# Patient Record
Sex: MALE | Race: WHITE | NOT HISPANIC OR LATINO | Employment: FULL TIME | URBAN - METROPOLITAN AREA
[De-identification: names, ages, dates, MRNs, and addresses within clinical notes are randomized per-mention and may not be internally consistent; named-entity substitution may affect disease eponyms.]

---

## 2023-10-30 ENCOUNTER — OFFICE VISIT (OUTPATIENT)
Dept: FAMILY MEDICINE CLINIC | Facility: CLINIC | Age: 43
End: 2023-10-30
Payer: COMMERCIAL

## 2023-10-30 VITALS
HEART RATE: 70 BPM | RESPIRATION RATE: 16 BRPM | HEIGHT: 72 IN | BODY MASS INDEX: 37.53 KG/M2 | DIASTOLIC BLOOD PRESSURE: 92 MMHG | OXYGEN SATURATION: 97 % | TEMPERATURE: 98.1 F | WEIGHT: 277.1 LBS | SYSTOLIC BLOOD PRESSURE: 152 MMHG

## 2023-10-30 DIAGNOSIS — Z00.00 ENCOUNTER FOR MEDICAL EXAMINATION TO ESTABLISH CARE: ICD-10-CM

## 2023-10-30 DIAGNOSIS — E66.9 CLASS 2 OBESITY WITHOUT SERIOUS COMORBIDITY WITH BODY MASS INDEX (BMI) OF 37.0 TO 37.9 IN ADULT, UNSPECIFIED OBESITY TYPE: ICD-10-CM

## 2023-10-30 DIAGNOSIS — H91.93 HEARING DIFFICULTY OF BOTH EARS: ICD-10-CM

## 2023-10-30 DIAGNOSIS — Z00.00 ANNUAL PHYSICAL EXAM: Primary | ICD-10-CM

## 2023-10-30 DIAGNOSIS — M72.2 PLANTAR FASCIITIS OF LEFT FOOT: ICD-10-CM

## 2023-10-30 DIAGNOSIS — Z00.00 ENCOUNTER FOR PREVENTIVE CARE: ICD-10-CM

## 2023-10-30 DIAGNOSIS — J30.89 ENVIRONMENTAL AND SEASONAL ALLERGIES: ICD-10-CM

## 2023-10-30 RX ORDER — CETIRIZINE HYDROCHLORIDE 10 MG/1
TABLET ORAL
COMMUNITY
Start: 2013-01-10

## 2023-10-30 NOTE — PROGRESS NOTES
"Chief Complaint  Establish Care (Pt needs physical and labs, wants to get health back on track. )    Subjective        Justice Min presents to [facility]  History of Present Illness  Establishing medical care  Annual physical today  Complaining of  ringing in ears on and off, muffling sounds for years but lately in last few months problem has gone worsens.  Pt stated he feels like he has sometimes hearing issues b/l.   Heel of left foot has hardened area which gets painful would like to get referral to podiatry.  Review of system is negative for fever, headache, chest pain, shortness of breath, palpitation, nausea, vomiting, any recent change in bladder habits.        Objective   Vital Signs:  /92 (BP Location: Left arm, Patient Position: Sitting, Cuff Size: Large Adult)   Pulse 70   Temp 98.1 °F (36.7 °C) (Oral)   Resp 16   Ht 182.9 cm (72\")   Wt 126 kg (277 lb 1.6 oz)   SpO2 97%   BMI 37.58 kg/m²   Estimated body mass index is 37.58 kg/m² as calculated from the following:    Height as of this encounter: 182.9 cm (72\").    Weight as of this encounter: 126 kg (277 lb 1.6 oz).       Class 2 Severe Obesity (BMI >=35 and <=39.9). Obesity-related health conditions include the following: none. Obesity is newly identified. BMI is is above average; BMI management plan is completed. We discussed portion control and increasing exercise.      Physical Exam  HENT:      Right Ear: Tympanic membrane, ear canal and external ear normal.      Left Ear: Tympanic membrane, ear canal and external ear normal.      Mouth/Throat:      Mouth: Mucous membranes are moist.      Pharynx: Oropharynx is clear.   Eyes:      Extraocular Movements: Extraocular movements intact.      Conjunctiva/sclera: Conjunctivae normal.      Pupils: Pupils are equal, round, and reactive to light.   Cardiovascular:      Rate and Rhythm: Normal rate.      Pulses: Normal pulses.      Heart sounds: Normal heart sounds. "   Pulmonary:      Effort: Pulmonary effort is normal.      Breath sounds: Normal breath sounds.   Abdominal:      General: Bowel sounds are normal.      Palpations: Abdomen is soft.   Musculoskeletal:         General: Normal range of motion.      Cervical back: Normal range of motion.   Skin:     General: Skin is warm.   Neurological:      Mental Status: He is alert and oriented to person, place, and time.        Result Review :                   Assessment and Plan   Diagnoses and all orders for this visit:    1. Annual physical exam (Primary)  -     CBC Auto Differential  -     Comprehensive Metabolic Panel  -     Lipid Panel With / Chol / HDL Ratio  -     TSH  -     Urinalysis With Microscopic - Urine, Clean Catch    2. Hearing difficulty of both ears  -     CBC Auto Differential  -     Comprehensive Metabolic Panel  -     Lipid Panel With / Chol / HDL Ratio  -     TSH  -     Urinalysis With Microscopic - Urine, Clean Catch  -     Ambulatory Referral to Audiology    3. Encounter for medical examination to establish care  -     CBC Auto Differential  -     Comprehensive Metabolic Panel  -     Lipid Panel With / Chol / HDL Ratio  -     TSH  -     Urinalysis With Microscopic - Urine, Clean Catch    4. Environmental and seasonal allergies    5. Plantar fasciitis of left foot  -     Ambulatory Referral to Podiatry    6. Encounter for preventive care    7. Class 2 obesity without serious comorbidity with body mass index (BMI) of 37.0 to 37.9 in adult, unspecified obesity type  Comments:  BMI 37.58, TSH ordered, encourage weight loss, more fruits and vegetables in the diet    Other orders  -     CBC & Differential  -     Microscopic Examination -    #Annual physical exam  Labs ordered, will review  #Hearing difficulty bilateral  Will refer patient to audiology for hearing test  #Environmental seasonal allergies  Taking Zyrtec 10 mg p.o. daily  #Planter fasciitis of left foot  Advised stretching exercises, can try  soaking feet in Epsom salt in severe pain  Podiatry referral given for further evaluation and management  #Preventive care encouraged to get flu, COVID shot.    Patient encouraged to partake of healthy diet rich in fresh fruits and vegetables as well as lean proteins.  Patient encouraged to participate in daily exercise with goal of 30 min sustained activity.  Wear seatbelt when driving  Flu shot annually         Follow Up   No follow-ups on file.  Patient was given instructions and counseling regarding his condition or for health maintenance advice. Please see specific information pulled into the AVS if appropriate.

## 2023-10-31 LAB
ALBUMIN SERPL-MCNC: 4.4 G/DL (ref 4.1–5.1)
ALBUMIN/GLOB SERPL: 1.9 {RATIO} (ref 1.2–2.2)
ALP SERPL-CCNC: 124 IU/L (ref 44–121)
ALT SERPL-CCNC: 33 IU/L (ref 0–44)
APPEARANCE UR: CLEAR
AST SERPL-CCNC: 23 IU/L (ref 0–40)
BACTERIA #/AREA URNS HPF: NORMAL /[HPF]
BASOPHILS # BLD AUTO: 0 X10E3/UL (ref 0–0.2)
BASOPHILS NFR BLD AUTO: 0 %
BILIRUB SERPL-MCNC: 0.7 MG/DL (ref 0–1.2)
BILIRUB UR QL STRIP: NEGATIVE
BUN SERPL-MCNC: 15 MG/DL (ref 6–24)
BUN/CREAT SERPL: 15 (ref 9–20)
CALCIUM SERPL-MCNC: 8.9 MG/DL (ref 8.7–10.2)
CASTS URNS QL MICRO: NORMAL /LPF
CHLORIDE SERPL-SCNC: 105 MMOL/L (ref 96–106)
CHOLEST SERPL-MCNC: 195 MG/DL (ref 100–199)
CHOLEST/HDLC SERPL: 3.9 RATIO (ref 0–5)
CO2 SERPL-SCNC: 22 MMOL/L (ref 20–29)
COLOR UR: YELLOW
CREAT SERPL-MCNC: 0.97 MG/DL (ref 0.76–1.27)
EGFRCR SERPLBLD CKD-EPI 2021: 100 ML/MIN/1.73
EOSINOPHIL # BLD AUTO: 0.1 X10E3/UL (ref 0–0.4)
EOSINOPHIL NFR BLD AUTO: 1 %
EPI CELLS #/AREA URNS HPF: NORMAL /HPF (ref 0–10)
ERYTHROCYTE [DISTWIDTH] IN BLOOD BY AUTOMATED COUNT: 13 % (ref 11.6–15.4)
GLOBULIN SER CALC-MCNC: 2.3 G/DL (ref 1.5–4.5)
GLUCOSE SERPL-MCNC: 88 MG/DL (ref 70–99)
GLUCOSE UR QL STRIP: NEGATIVE
HCT VFR BLD AUTO: 44.2 % (ref 37.5–51)
HDLC SERPL-MCNC: 50 MG/DL
HGB BLD-MCNC: 14.4 G/DL (ref 13–17.7)
HGB UR QL STRIP: NEGATIVE
IMM GRANULOCYTES # BLD AUTO: 0 X10E3/UL (ref 0–0.1)
IMM GRANULOCYTES NFR BLD AUTO: 0 %
KETONES UR QL STRIP: NEGATIVE
LDLC SERPL CALC-MCNC: 112 MG/DL (ref 0–99)
LEUKOCYTE ESTERASE UR QL STRIP: NEGATIVE
LYMPHOCYTES # BLD AUTO: 1.9 X10E3/UL (ref 0.7–3.1)
LYMPHOCYTES NFR BLD AUTO: 25 %
MCH RBC QN AUTO: 29.3 PG (ref 26.6–33)
MCHC RBC AUTO-ENTMCNC: 32.6 G/DL (ref 31.5–35.7)
MCV RBC AUTO: 90 FL (ref 79–97)
MICRO URNS: NORMAL
MICRO URNS: NORMAL
MONOCYTES # BLD AUTO: 0.5 X10E3/UL (ref 0.1–0.9)
MONOCYTES NFR BLD AUTO: 7 %
NEUTROPHILS # BLD AUTO: 4.9 X10E3/UL (ref 1.4–7)
NEUTROPHILS NFR BLD AUTO: 67 %
NITRITE UR QL STRIP: NEGATIVE
PH UR STRIP: 6 [PH] (ref 5–7.5)
PLATELET # BLD AUTO: 259 X10E3/UL (ref 150–450)
POTASSIUM SERPL-SCNC: 4.2 MMOL/L (ref 3.5–5.2)
PROT SERPL-MCNC: 6.7 G/DL (ref 6–8.5)
PROT UR QL STRIP: NEGATIVE
RBC # BLD AUTO: 4.91 X10E6/UL (ref 4.14–5.8)
RBC #/AREA URNS HPF: NORMAL /HPF (ref 0–2)
SODIUM SERPL-SCNC: 142 MMOL/L (ref 134–144)
SP GR UR STRIP: 1.01 (ref 1–1.03)
TRIGL SERPL-MCNC: 187 MG/DL (ref 0–149)
TSH SERPL DL<=0.005 MIU/L-ACNC: 1.05 UIU/ML (ref 0.45–4.5)
UROBILINOGEN UR STRIP-MCNC: 0.2 MG/DL (ref 0.2–1)
VLDLC SERPL CALC-MCNC: 33 MG/DL (ref 5–40)
WBC # BLD AUTO: 7.4 X10E3/UL (ref 3.4–10.8)
WBC #/AREA URNS HPF: NORMAL /HPF (ref 0–5)

## 2023-11-13 ENCOUNTER — OFFICE VISIT (OUTPATIENT)
Dept: FAMILY MEDICINE CLINIC | Facility: CLINIC | Age: 43
End: 2023-11-13
Payer: COMMERCIAL

## 2023-11-13 VITALS
DIASTOLIC BLOOD PRESSURE: 88 MMHG | OXYGEN SATURATION: 98 % | RESPIRATION RATE: 16 BRPM | HEIGHT: 72 IN | HEART RATE: 75 BPM | TEMPERATURE: 98.3 F | BODY MASS INDEX: 37.15 KG/M2 | WEIGHT: 274.3 LBS | SYSTOLIC BLOOD PRESSURE: 122 MMHG

## 2023-11-13 DIAGNOSIS — F41.9 ANXIETY: ICD-10-CM

## 2023-11-13 DIAGNOSIS — I10 PRIMARY HYPERTENSION: Primary | ICD-10-CM

## 2023-11-13 PROCEDURE — 99214 OFFICE O/P EST MOD 30 MIN: CPT | Performed by: STUDENT IN AN ORGANIZED HEALTH CARE EDUCATION/TRAINING PROGRAM

## 2023-11-13 RX ORDER — LISINOPRIL 5 MG/1
5 TABLET ORAL DAILY
Qty: 90 TABLET | Refills: 1 | Status: SHIPPED | OUTPATIENT
Start: 2023-11-13

## 2023-11-13 NOTE — PROGRESS NOTES
"Chief Complaint  Follow-up (Follow up on bp. 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  For hypertension.  Patient is monitoring blood pressure at home with wrist monitor and all his readings were coming more than 150/100.  Patient stated he brought his blood pressure machine today to this office just to compare his blood pressure with manual machine.  Also complain of having anxiety symptoms.  Patient stated financially he and his family is doing very well although his wife Ms. Julien has recently been diagnosed with breast cancer second time and is undergoing radiation therapy and patient mother who is also cancer patient lives with them.  Patient has 2 kids son 15 years old who is in high school and daughter who is 10 years old studies in elementary school.      Objective   Vital Signs:  /88 (BP Location: Left arm, Patient Position: Sitting, Cuff Size: Large Adult)   Pulse 75   Temp 98.3 °F (36.8 °C) (Oral)   Resp 16   Ht 182 cm (71.65\")   Wt 124 kg (274 lb 4.8 oz)   SpO2 98%   BMI 37.56 kg/m²   Estimated body mass index is 37.56 kg/m² as calculated from the following:    Height as of this encounter: 182 cm (71.65\").    Weight as of this encounter: 124 kg (274 lb 4.8 oz).               Physical Exam  HENT:      Head: Normocephalic and atraumatic.      Mouth/Throat:      Mouth: Mucous membranes are moist.      Pharynx: Oropharynx is clear.   Eyes:      Extraocular Movements: Extraocular movements intact.      Conjunctiva/sclera: Conjunctivae normal.      Pupils: Pupils are equal, round, and reactive to light.   Cardiovascular:      Rate and Rhythm: Normal rate and regular rhythm.   Pulmonary:      Effort: Pulmonary effort is normal.      Breath sounds: Normal breath sounds.   Abdominal:      General: Bowel sounds are normal.      Palpations: Abdomen is soft.   Musculoskeletal:         General: Normal range of motion.      " Cervical back: Neck supple.   Skin:     General: Skin is warm.      Capillary Refill: Capillary refill takes less than 2 seconds.   Neurological:      General: No focal deficit present.      Mental Status: He is alert and oriented to person, place, and time. Mental status is at baseline.   Psychiatric:         Mood and Affect: Mood normal.        Result Review :                   Assessment and Plan   Diagnoses and all orders for this visit:    1. Primary hypertension (Primary)  -     lisinopril (PRINIVIL,ZESTRIL) 5 MG tablet; Take 1 tablet by mouth Daily.  Dispense: 90 tablet; Refill: 1    2. Anxiety    #Hypertension  Newly diagnosed  Recheck blood pressure 140/90  All home readings are more than 150/100  Also check patient blood pressure with his wrist monitoring machine and it came out 150/100  Started patient on lisinopril 5 mg p.o. daily  Continue BP monitoring at home  RTC in a month for recheck of the blood pressure and medication management    #Anxiety  Advised patient to do breathing exercise or distraction technique in order to deal with anxiety symptoms  We will follow-up in the next visit    Recommended the following for better blood pressure management: take medication(s) daily as recommended, maintain low sodium diet (< 3 grams), lose weight , exercise regularly, minimizealcohol , manage stress better, and decrease caffeine           Follow Up   No follow-ups on file.  Patient was given instructions and counseling regarding his condition or for health maintenance advice. Please see specific information pulled into the AVS if appropriate.

## 2023-12-11 ENCOUNTER — OFFICE VISIT (OUTPATIENT)
Dept: FAMILY MEDICINE CLINIC | Facility: CLINIC | Age: 43
End: 2023-12-11
Payer: COMMERCIAL

## 2023-12-11 VITALS
BODY MASS INDEX: 37.52 KG/M2 | RESPIRATION RATE: 16 BRPM | HEART RATE: 74 BPM | TEMPERATURE: 97.7 F | DIASTOLIC BLOOD PRESSURE: 82 MMHG | HEIGHT: 72 IN | WEIGHT: 277 LBS | OXYGEN SATURATION: 96 % | SYSTOLIC BLOOD PRESSURE: 140 MMHG

## 2023-12-11 DIAGNOSIS — I10 PRIMARY HYPERTENSION: Primary | ICD-10-CM

## 2023-12-11 DIAGNOSIS — E78.5 HYPERLIPIDEMIA, UNSPECIFIED HYPERLIPIDEMIA TYPE: ICD-10-CM

## 2023-12-11 PROCEDURE — 99214 OFFICE O/P EST MOD 30 MIN: CPT | Performed by: STUDENT IN AN ORGANIZED HEALTH CARE EDUCATION/TRAINING PROGRAM

## 2023-12-11 RX ORDER — LISINOPRIL 10 MG/1
10 TABLET ORAL DAILY
Qty: 90 TABLET | Refills: 3 | Status: SHIPPED | OUTPATIENT
Start: 2023-12-11

## 2023-12-11 NOTE — PROGRESS NOTES
"Chief Complaint  Follow-up (Follow up on BP )    Subjective        Justice Min presents to [facility]  History of Present Illness  For follow-up on the hypertension.  Patient stated his symptoms of feeling fatigue, tiredness, having sweats has resolved since he started on this medication.  Denies having any negative effect of the medication  Review of system is negative for fever, headache, chest pain, shortness of breath, palpitation, nausea, vomiting, any recent change in bladder habits.      Objective   Vital Signs:  /82 (BP Location: Left arm, Patient Position: Sitting, Cuff Size: Large Adult)   Pulse 74   Temp 97.7 °F (36.5 °C) (Oral)   Resp 16   Ht 182 cm (71.65\")   Wt 126 kg (277 lb)   SpO2 96%   BMI 37.93 kg/m²   Estimated body mass index is 37.93 kg/m² as calculated from the following:    Height as of this encounter: 182 cm (71.65\").    Weight as of this encounter: 126 kg (277 lb).               Physical Exam  HENT:      Head: Normocephalic and atraumatic.      Mouth/Throat:      Mouth: Mucous membranes are moist.      Pharynx: Oropharynx is clear.   Eyes:      Extraocular Movements: Extraocular movements intact.      Conjunctiva/sclera: Conjunctivae normal.      Pupils: Pupils are equal, round, and reactive to light.   Cardiovascular:      Rate and Rhythm: Normal rate and regular rhythm.   Pulmonary:      Effort: Pulmonary effort is normal.      Breath sounds: Normal breath sounds.   Abdominal:      General: Bowel sounds are normal.      Palpations: Abdomen is soft.   Musculoskeletal:         General: Normal range of motion.      Cervical back: Neck supple.   Skin:     General: Skin is warm.      Capillary Refill: Capillary refill takes less than 2 seconds.   Neurological:      General: No focal deficit present.      Mental Status: He is alert and oriented to person, place, and time. Mental status is at baseline.   Psychiatric:         Mood and Affect: Mood " normal.        Result Review :                   Assessment and Plan   Diagnoses and all orders for this visit:    1. Primary hypertension (Primary)  -     lisinopril (PRINIVIL,ZESTRIL) 10 MG tablet; Take 1 tablet by mouth Daily.  Dispense: 90 tablet; Refill: 3  -     Lipid Panel With / Chol / HDL Ratio; Future  -     Basic Metabolic Panel; Future    2. Hyperlipidemia, unspecified hyperlipidemia type  -     Lipid Panel With / Chol / HDL Ratio; Future  -     Basic Metabolic Panel; Future      Pretension  Recheck blood pressure 140/88  Increase lisinopril to 10 mg p.o. daily  Continue BP monitoring at home  Bring BP readings in the next office visit  RTC in 6 months or sooner if blood pressure is more than 140/90  Recommended the following for better blood pressure management: take medication(s) daily as recommended, maintain low sodium diet (< 3 grams), lose weight , exercise regularly, minimizealcohol , manage stress better, and decrease caffeine         Follow Up   No follow-ups on file.  Patient was given instructions and counseling regarding his condition or for health maintenance advice. Please see specific information pulled into the AVS if appropriate.

## 2024-03-25 ENCOUNTER — OFFICE VISIT (OUTPATIENT)
Dept: FAMILY MEDICINE CLINIC | Facility: CLINIC | Age: 44
End: 2024-03-25
Payer: COMMERCIAL

## 2024-03-25 VITALS
TEMPERATURE: 97.2 F | RESPIRATION RATE: 14 BRPM | SYSTOLIC BLOOD PRESSURE: 132 MMHG | DIASTOLIC BLOOD PRESSURE: 78 MMHG | OXYGEN SATURATION: 96 % | HEIGHT: 72 IN | HEART RATE: 70 BPM | WEIGHT: 289 LBS | BODY MASS INDEX: 39.14 KG/M2

## 2024-03-25 DIAGNOSIS — R21 RASH: Primary | ICD-10-CM

## 2024-03-25 PROCEDURE — 99213 OFFICE O/P EST LOW 20 MIN: CPT | Performed by: STUDENT IN AN ORGANIZED HEALTH CARE EDUCATION/TRAINING PROGRAM

## 2024-03-25 RX ORDER — TRIAMCINOLONE ACETONIDE 1 MG/G
1 OINTMENT TOPICAL 2 TIMES DAILY
Qty: 80 G | Refills: 0 | Status: SHIPPED | OUTPATIENT
Start: 2024-03-25

## 2024-03-25 RX ORDER — METHYLPREDNISOLONE 4 MG/1
TABLET ORAL
Qty: 21 TABLET | Refills: 0 | Status: SHIPPED | OUTPATIENT
Start: 2024-03-25

## 2024-03-25 NOTE — PROGRESS NOTES
"Chief Complaint  Rash (On left thigh waist and buttock , a week ago he was doing yard work and next morning noticed it middle of week noticed spreading. Pt says its just a little itchy not painful. Takes bendryal at night )    Subjective        Justice Min presents to [facility]  History of Present Illness  Rash started last week, initially responded to zinc oxide but then started new rash closer to previous one. Not painful, just itchy but got worried since rash get enlarged involved left buttock area in a night.    Objective   Vital Signs:  /78 (BP Location: Left arm, Patient Position: Sitting, Cuff Size: Large Adult)   Pulse 70   Temp 97.2 °F (36.2 °C) (Temporal)   Resp 14   Ht 182 cm (71.65\")   Wt 131 kg (289 lb)   SpO2 96%   BMI 39.57 kg/m²   Estimated body mass index is 39.57 kg/m² as calculated from the following:    Height as of this encounter: 182 cm (71.65\").    Weight as of this encounter: 131 kg (289 lb).               Physical Exam  HENT:      Head: Normocephalic and atraumatic.      Mouth/Throat:      Mouth: Mucous membranes are moist.      Pharynx: Oropharynx is clear.   Eyes:      Extraocular Movements: Extraocular movements intact.      Conjunctiva/sclera: Conjunctivae normal.      Pupils: Pupils are equal, round, and reactive to light.   Cardiovascular:      Rate and Rhythm: Normal rate and regular rhythm.   Pulmonary:      Effort: Pulmonary effort is normal.      Breath sounds: Normal breath sounds.   Abdominal:      General: Bowel sounds are normal.      Palpations: Abdomen is soft.   Musculoskeletal:         General: Normal range of motion.      Cervical back: Neck supple.   Skin:     General: Skin is warm.      Capillary Refill: Capillary refill takes less than 2 seconds.      Findings: Rash present.      Comments: Red color macular rash present on waistline left side and on left buttock   Neurological:      General: No focal deficit present.      " Mental Status: He is alert and oriented to person, place, and time. Mental status is at baseline.   Psychiatric:         Mood and Affect: Mood normal.        Result Review :                     Assessment and Plan     Diagnoses and all orders for this visit:    1. Rash (Primary)  -     triamcinolone (KENALOG) 0.1 % ointment; Apply 1 Application topically to the appropriate area as directed 2 (Two) Times a Day.  Dispense: 80 g; Refill: 0  -     methylPREDNISolone (MEDROL) 4 MG dose pack; Take as directed on package instructions.  Dispense: 21 tablet; Refill: 0    # my impression is either pt got rash from zinc oxide or reaction to any bug bite  Advise pt to use above cream and wait for 5 days if rash unresponsive to treatment or gets bigger then start using medrol dose mesfin.  Encourage to communicate through My chart or call office if any questions in between.           Follow Up     No follow-ups on file.  Patient was given instructions and counseling regarding his condition or for health maintenance advice. Please see specific information pulled into the AVS if appropriate.

## 2024-06-24 ENCOUNTER — OFFICE VISIT (OUTPATIENT)
Dept: FAMILY MEDICINE CLINIC | Facility: CLINIC | Age: 44
End: 2024-06-24
Payer: COMMERCIAL

## 2024-06-24 VITALS
TEMPERATURE: 98.3 F | BODY MASS INDEX: 36.77 KG/M2 | DIASTOLIC BLOOD PRESSURE: 96 MMHG | RESPIRATION RATE: 16 BRPM | OXYGEN SATURATION: 98 % | SYSTOLIC BLOOD PRESSURE: 110 MMHG | HEIGHT: 72 IN | WEIGHT: 271.5 LBS | HEART RATE: 82 BPM

## 2024-06-24 DIAGNOSIS — E78.5 HYPERLIPIDEMIA, UNSPECIFIED HYPERLIPIDEMIA TYPE: ICD-10-CM

## 2024-06-24 DIAGNOSIS — I10 PRIMARY HYPERTENSION: Primary | ICD-10-CM

## 2024-06-24 PROCEDURE — 99214 OFFICE O/P EST MOD 30 MIN: CPT | Performed by: STUDENT IN AN ORGANIZED HEALTH CARE EDUCATION/TRAINING PROGRAM

## 2024-06-24 NOTE — PROGRESS NOTES
"Chief Complaint  Hypertension    Subjective        Justice Min presents to [facility] PRIMARY [facility]  History of Present Illness  For 6 month follow up on hypertension and labs done.  No new complaints  Review of system is negative for fever, headache, chest pain, shortness of breath, palpitation, nausea, vomiting, any recent change in bladder habits.    Objective   Vital Signs:  /96 (BP Location: Left arm, Patient Position: Sitting, Cuff Size: Adult)   Pulse 82   Temp 98.3 °F (36.8 °C) (Oral)   Resp 16   Ht 182.9 cm (72\")   Wt 123 kg (271 lb 8 oz)   SpO2 98%   BMI 36.82 kg/m²   Estimated body mass index is 36.82 kg/m² as calculated from the following:    Height as of this encounter: 182.9 cm (72\").    Weight as of this encounter: 123 kg (271 lb 8 oz).               Physical Exam  HENT:      Head: Normocephalic and atraumatic.      Mouth/Throat:      Mouth: Mucous membranes are moist.      Pharynx: Oropharynx is clear.   Eyes:      Extraocular Movements: Extraocular movements intact.      Conjunctiva/sclera: Conjunctivae normal.      Pupils: Pupils are equal, round, and reactive to light.   Cardiovascular:      Rate and Rhythm: Normal rate and regular rhythm.   Pulmonary:      Effort: Pulmonary effort is normal.      Breath sounds: Normal breath sounds.   Abdominal:      General: Bowel sounds are normal.      Palpations: Abdomen is soft.   Musculoskeletal:         General: Normal range of motion.      Cervical back: Neck supple.   Skin:     General: Skin is warm.      Capillary Refill: Capillary refill takes less than 2 seconds.   Neurological:      General: No focal deficit present.      Mental Status: He is alert and oriented to person, place, and time. Mental status is at baseline.   Psychiatric:         Mood and Affect: Mood normal.        Result Review :    The following data was reviewed by: Kade Chaidez MD on 06/24/2024:  CMP          10/30/2023    15:03 6/19/2024    08:32 "   CMP   Glucose 88  89    BUN 15  13    Creatinine 0.97  0.96    Sodium 142  139    Potassium 4.2  4.8    Chloride 105  105    Calcium 8.9  8.7    Total Protein 6.7     Albumin 4.4     Globulin 2.3     Total Bilirubin 0.7     Alkaline Phosphatase 124     AST (SGOT) 23     ALT (SGPT) 33     BUN/Creatinine Ratio 15  13.5      CBC          10/30/2023    15:03   CBC   WBC 7.4    RBC 4.91    Hemoglobin 14.4    Hematocrit 44.2    MCV 90    MCH 29.3    MCHC 32.6    RDW 13.0    Platelets 259      Lipid Panel          10/30/2023    15:03 6/19/2024    08:32   Lipid Panel   Total Cholesterol 195  187    Triglycerides 187  149    HDL Cholesterol 50  46    VLDL Cholesterol 33  26    LDL Cholesterol  112  115      TSH          10/30/2023    15:03   TSH   TSH 1.050                   Assessment and Plan     Diagnoses and all orders for this visit:    1. Primary hypertension (Primary)  -     CBC Auto Differential; Future  -     Comprehensive Metabolic Panel; Future  -     Lipid Panel With / Chol / HDL Ratio; Future  -     TSH; Future  -     Urinalysis With Microscopic - Urine, Clean Catch; Future    2. Hyperlipidemia, unspecified hyperlipidemia type  -     CBC Auto Differential; Future  -     Comprehensive Metabolic Panel; Future  -     Lipid Panel With / Chol / HDL Ratio; Future  -     TSH; Future  -     Urinalysis With Microscopic - Urine, Clean Catch; Future         # Hypertension  On lisinopril 10 mg Po daily, continue same  # Lab follow-up  LDL is stable around 115  The 10-year ASCVD risk score (Zuleika AMIN, et al., 2019) is: 1.4%   CONTINUE LIFESTYLE MODIFICATION AN which includes diet and exercise    Fasting glucose 89, creatinine/EGFR 0.96/100 and electrolytes within normal limit    Follow Up     No follow-ups on file.  Patient was given instructions and counseling regarding his condition or for health maintenance advice. Please see specific information pulled into the AVS if appropriate.

## 2024-11-08 ENCOUNTER — OFFICE VISIT (OUTPATIENT)
Dept: FAMILY MEDICINE CLINIC | Facility: CLINIC | Age: 44
End: 2024-11-08
Payer: COMMERCIAL

## 2024-11-08 VITALS
OXYGEN SATURATION: 99 % | SYSTOLIC BLOOD PRESSURE: 120 MMHG | HEART RATE: 76 BPM | HEIGHT: 72 IN | BODY MASS INDEX: 35.59 KG/M2 | RESPIRATION RATE: 16 BRPM | TEMPERATURE: 97.9 F | DIASTOLIC BLOOD PRESSURE: 102 MMHG | WEIGHT: 262.8 LBS

## 2024-11-08 DIAGNOSIS — E78.5 HYPERLIPIDEMIA, UNSPECIFIED HYPERLIPIDEMIA TYPE: ICD-10-CM

## 2024-11-08 DIAGNOSIS — Z00.00 ENCOUNTER FOR PREVENTIVE CARE: ICD-10-CM

## 2024-11-08 DIAGNOSIS — I10 PRIMARY HYPERTENSION: ICD-10-CM

## 2024-11-08 DIAGNOSIS — Z23 NEED FOR IMMUNIZATION AGAINST INFLUENZA: ICD-10-CM

## 2024-11-08 DIAGNOSIS — Z00.00 ANNUAL PHYSICAL EXAM: Primary | ICD-10-CM

## 2024-11-08 DIAGNOSIS — F41.9 ANXIETY: ICD-10-CM

## 2024-11-08 PROCEDURE — 90471 IMMUNIZATION ADMIN: CPT | Performed by: STUDENT IN AN ORGANIZED HEALTH CARE EDUCATION/TRAINING PROGRAM

## 2024-11-08 PROCEDURE — 90656 IIV3 VACC NO PRSV 0.5 ML IM: CPT | Performed by: STUDENT IN AN ORGANIZED HEALTH CARE EDUCATION/TRAINING PROGRAM

## 2024-11-08 PROCEDURE — 99396 PREV VISIT EST AGE 40-64: CPT | Performed by: STUDENT IN AN ORGANIZED HEALTH CARE EDUCATION/TRAINING PROGRAM

## 2024-11-08 RX ORDER — LISINOPRIL 20 MG/1
20 TABLET ORAL DAILY
Qty: 90 TABLET | Refills: 1 | Status: SHIPPED | OUTPATIENT
Start: 2024-11-08

## 2024-11-08 NOTE — PROGRESS NOTES
"Chief Complaint  Annual Exam, Abnormal Lab (2024, CBC WITH DIFF & LIPID.), and Hypertension (LEFT, 120/102.)    Subjective    History of Present Illness  The patient is a 44-year-old gentleman here for a physical examination.    He reports experiencing high blood pressure, which he attributes to recent stressors, including the outcome of an election and his mother's declining health since late summer. His mother suffers from dementia, balance issues, and is at risk of falling. 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    To manage his health, he has been proactive by going to the gym and starting talk therapy in early 2024 to better handle his anxiety. He has also made dietary changes, reducing his intake of red meat and increasing his consumption of chicken and turkey. He has been abstinent from alcohol since 2024 and has lost weight, dropping from 289 pounds in 2024 to 262 pounds currently.    SOCIAL HISTORY  He works for [employer] as a . He has 2 kids aged 16 and 11. He does not smoke.    FAMILY HISTORY  His grandfather  of colon cancer. His father had heart problems. His maternal grandmother had abdominal tumors. His mother has lung cancer.       Justice Min presents to [facility]  History of Present Illness    Objective   Vital Signs:  BP (!) 120/102 (BP Location: Left arm, Patient Position: Sitting, Cuff Size: Large Adult)   Pulse 76   Temp 97.9 °F (36.6 °C) (Oral)   Resp 16   Ht 182.9 cm (72\")   Wt 119 kg (262 lb 12.8 oz)   SpO2 99%   BMI 35.64 kg/m²   Estimated body mass index is 35.64 kg/m² as calculated from the following:    Height as of this encounter: 182.9 cm (72\").    Weight as of this encounter: 119 kg (262 lb 12.8 oz).    Class 2 Severe Obesity (BMI >=35 and <=39.9). Obesity-related health conditions include the following: hypertension. Obesity is newly identified. BMI is is above average; BMI " management plan is completed. We discussed portion control and increasing exercise.      Physical Exam  HENT:      Head: Normocephalic and atraumatic.      Mouth/Throat:      Mouth: Mucous membranes are moist.      Pharynx: Oropharynx is clear.   Eyes:      Extraocular Movements: Extraocular movements intact.      Conjunctiva/sclera: Conjunctivae normal.      Pupils: Pupils are equal, round, and reactive to light.   Cardiovascular:      Rate and Rhythm: Normal rate and regular rhythm.   Pulmonary:      Effort: Pulmonary effort is normal.      Breath sounds: Normal breath sounds.   Abdominal:      General: Bowel sounds are normal.      Palpations: Abdomen is soft.   Musculoskeletal:         General: Normal range of motion.      Cervical back: Neck supple.   Skin:     General: Skin is warm.      Capillary Refill: Capillary refill takes less than 2 seconds.   Neurological:      General: No focal deficit present.      Mental Status: He is alert and oriented to person, place, and time. Mental status is at baseline.   Psychiatric:         Mood and Affect: Mood normal.        Result Review :  The following data was reviewed by: Kade Chaidez MD on 11/08/2024:  CMP          6/19/2024    08:32 11/1/2024    09:40   CMP   Glucose 89  91    BUN 13  14    Creatinine 0.96  0.95    Sodium 139  141    Potassium 4.8  5.0    Chloride 105  107    Calcium 8.7  8.9    Total Protein  6.6    Albumin  4.1    Globulin  2.5    Total Bilirubin  0.7    Alkaline Phosphatase  108    AST (SGOT)  19    ALT (SGPT)  31    BUN/Creatinine Ratio 13.5  14.7      CBC          11/1/2024    09:40   CBC   WBC 5.22    RBC 4.90    Hemoglobin 14.5    Hematocrit 43.5    MCV 88.8    MCH 29.6    MCHC 33.3    RDW 12.2    Platelets 250      Lipid Panel          6/19/2024    08:32 11/1/2024    09:40   Lipid Panel   Total Cholesterol 187  179    Triglycerides 149  117    HDL Cholesterol 46  49    VLDL Cholesterol 26  21    LDL Cholesterol  115  109      TSH           11/1/2024    09:40   TSH   TSH 0.807                Assessment and Plan   Diagnoses and all orders for this visit:    1. Annual physical exam (Primary)    2. Primary hypertension  -     lisinopril (PRINIVIL,ZESTRIL) 20 MG tablet; Take 1 tablet by mouth Daily.  Dispense: 90 tablet; Refill: 1    3. Need for immunization against influenza  -     Fluzone >6mos (5515-3090)    4. Hyperlipidemia, unspecified hyperlipidemia type    5. Anxiety        Assessment & Plan  1. Hypertension.  His blood pressure was 120/100 mmHg during the visit, with a previous diastolic reading of 96 mmHg in June. He has been experiencing significant stress due to personal and family issues, which may be contributing to elevated blood pressure. Despite regular exercise and dietary changes, his blood pressure remains high. The dosage of lisinopril will be increased from 10 mg to 20 mg daily. He is advised to monitor his blood pressure regularly and report any symptoms of hypotension, such as dizziness or fatigue. If his blood pressure readings fall below 100/50 mmHg, he should contact the clinic.     2. Anxiety.  He has been experiencing increased anxiety due to recent stressors, including family health issues and personal responsibilities. He has started talk therapy since early October and is learning techniques to manage anxiety. He is advised to continue with therapy and utilize the coping strategies discussed.    3. Health Maintenance.  An influenza vaccine will be administered today. He is advised to receive the COVID-19 vaccine at [facility]. Colon cancer screening will be discussed at his next physical when he turns 46. He is not due for any other screenings currently.    Follow-up  Return in 3 months for follow-up.   The 10-year ASCVD risk score (Zuleika AMIN, et al., 2019) is: 1.4%    Values used to calculate the score:      Age: 44 years      Sex: Male      Is Non- : No      Diabetic: No      Tobacco smoker: No       Systolic Blood Pressure: 120 mmHg      Is BP treated: Yes      HDL Cholesterol: 49 mg/dL      Total Cholesterol: 179 mg/dL  Patient encouraged to partake of healthy diet rich in fresh fruits and vegetables as well as lean proteins.  Patient encouraged to participate in daily exercise with goal of 30 min sustained activity.  Wear seatbelt when driving  Flu shot annually           Follow Up   No follow-ups on file.  Patient was given instructions and counseling regarding his condition or for health maintenance advice. Please see specific information pulled into the AVS if appropriate.     Patient or patient representative verbalized consent for the use of Ambient Listening during the visit with  Kade Chaidez MD for chart documentation. 11/14/2024  11:53 EST

## 2024-11-14 ENCOUNTER — TELEPHONE (OUTPATIENT)
Dept: FAMILY MEDICINE CLINIC | Facility: CLINIC | Age: 44
End: 2024-11-14

## 2024-11-14 NOTE — TELEPHONE ENCOUNTER
Hub staff attempted to follow warm transfer process and was unsuccessful     Caller: Justice Min    Relationship to patient: Self    Best call back number: [phone]     Patient is needing: PATIENT IS REQUESTING A MARIO REGARDING LAB APPOINTMENT THAT IS SCHEDULE FOR 11/15/24 . PLEASE CALL PATIENT TO DISCUSS.

## 2024-11-20 ENCOUNTER — TELEPHONE (OUTPATIENT)
Dept: FAMILY MEDICINE CLINIC | Facility: CLINIC | Age: 44
End: 2024-11-20

## 2024-11-20 NOTE — TELEPHONE ENCOUNTER
Caller: LUZ TINAJERO    Relationship: PATIENT    Best call back number:   Telephone Information:   Mobile [phone]       What orders are you requesting (i.e. lab or imaging): BIOMETRIC SCREENING LABS    In what timeframe would the patient need to come in: ASAP    Where will you receive your lab/imaging services: [facility]    Additional notes: PATIENT RECENTLY HAD THE LAB WORK DONE, BUT HE RECEIVED AN EMAIL FROM [facility] ADVISING THE LABS WERE PERFORMED WERE OUTSIDE OF THE COLLECTION RANGE SO THEY'RE CONSIDERING THEM INVALID.

## 2025-02-24 ENCOUNTER — OFFICE VISIT (OUTPATIENT)
Dept: FAMILY MEDICINE CLINIC | Facility: CLINIC | Age: 45
End: 2025-02-24
Payer: COMMERCIAL

## 2025-02-24 VITALS
HEART RATE: 64 BPM | TEMPERATURE: 98 F | SYSTOLIC BLOOD PRESSURE: 130 MMHG | HEIGHT: 72 IN | OXYGEN SATURATION: 97 % | WEIGHT: 270.8 LBS | BODY MASS INDEX: 36.68 KG/M2 | RESPIRATION RATE: 16 BRPM | DIASTOLIC BLOOD PRESSURE: 84 MMHG

## 2025-02-24 DIAGNOSIS — E78.5 HYPERLIPIDEMIA, UNSPECIFIED HYPERLIPIDEMIA TYPE: ICD-10-CM

## 2025-02-24 DIAGNOSIS — I10 PRIMARY HYPERTENSION: Primary | ICD-10-CM

## 2025-02-24 DIAGNOSIS — Z00.00 ANNUAL PHYSICAL EXAM: ICD-10-CM

## 2025-02-24 RX ORDER — MULTIPLE VITAMINS W/ MINERALS TAB 9MG-400MCG
1 TAB ORAL DAILY
COMMUNITY

## 2025-02-24 NOTE — PROGRESS NOTES
"Chief Complaint  Hypertension    Subjective    History of Present Illness  The patient is a 45-year-old gentleman here for a 3-month follow-up on blood pressure.    He reports no physical symptoms related to his elevated blood pressure. He has been compliant with his medication regimen and does not experience any side effects such as dizziness or lightheadedness. He expresses concern about the high blood pressure readings observed during this visit, despite feeling normal. He acknowledges experiencing anxiety during blood pressure tests. He possesses a home blood pressure monitor. He is currently on lisinopril 20 mg.    He has experienced a weight gain of 8 pounds, which he attributes to a disruption in his routine following the death of his mother in 01/2024. Prior to this, he had been maintaining a healthy diet and regular gym workouts, which had resulted in weight loss. However, these activities were significantly reduced during his mother's illness. He is now attempting to resume his previous routine.    FAMILY HISTORY  His mother had lung cancer and passed away in January.    MEDICATIONS  lisinopril       Justice Min presents to [facility]  History of Present Illness    Objective   Vital Signs:  /84 (BP Location: Left arm, Patient Position: Sitting, Cuff Size: Adult)   Pulse 64   Temp 98 °F (36.7 °C) (Oral)   Resp 16   Ht 182.9 cm (72\")   Wt 123 kg (270 lb 12.8 oz)   SpO2 97%   BMI 36.73 kg/m²   Estimated body mass index is 36.73 kg/m² as calculated from the following:    Height as of this encounter: 182.9 cm (72\").    Weight as of this encounter: 123 kg (270 lb 12.8 oz).            Physical Exam  HENT:      Head: Normocephalic and atraumatic.      Mouth/Throat:      Mouth: Mucous membranes are moist.      Pharynx: Oropharynx is clear.   Eyes:      Extraocular Movements: Extraocular movements intact.      Conjunctiva/sclera: Conjunctivae normal.      Pupils: Pupils " are equal, round, and reactive to light.   Cardiovascular:      Rate and Rhythm: Normal rate and regular rhythm.   Pulmonary:      Effort: Pulmonary effort is normal.      Breath sounds: Normal breath sounds.   Abdominal:      General: Bowel sounds are normal.      Palpations: Abdomen is soft.   Musculoskeletal:         General: Normal range of motion.      Cervical back: Neck supple.   Skin:     General: Skin is warm.      Capillary Refill: Capillary refill takes less than 2 seconds.   Neurological:      General: No focal deficit present.      Mental Status: He is alert and oriented to person, place, and time. Mental status is at baseline.   Psychiatric:         Mood and Affect: Mood normal.        Result Review :  The following data was reviewed by: Kade Chaidez MD on 02/24/2025:  CMP          6/19/2024    08:32 11/1/2024    09:40   CMP   Glucose 89  91    BUN 13  14    Creatinine 0.96  0.95    EGFR 100.6  101.9    Sodium 139  141    Potassium 4.8  5.0    Chloride 105  107    Calcium 8.7  8.9    Total Protein  6.6    Albumin  4.1    Globulin  2.5    Total Bilirubin  0.7    Alkaline Phosphatase  108    AST (SGOT)  19    ALT (SGPT)  31    Albumin/Globulin Ratio  1.6    BUN/Creatinine Ratio 13.5  14.7      CBC          11/1/2024    09:40   CBC   WBC 5.22    RBC 4.90    Hemoglobin 14.5    Hematocrit 43.5    MCV 88.8    MCH 29.6    MCHC 33.3    RDW 12.2    Platelets 250      Lipid Panel          6/19/2024    08:32 11/1/2024    09:40   Lipid Panel   Total Cholesterol 187  179    Triglycerides 149  117    HDL Cholesterol 46  49    VLDL Cholesterol 26  21    LDL Cholesterol  115  109      TSH          11/1/2024    09:40   TSH   TSH 0.807                Assessment and Plan   Diagnoses and all orders for this visit:    1. Primary hypertension (Primary)    2. Hyperlipidemia, unspecified hyperlipidemia type    3. Annual physical exam  -     Comprehensive Metabolic Panel; Future  -     Lipid Panel With / Chol / HDL Ratio;  Future  -     Microalbumin / Creatinine Urine Ratio - Urine, Clean Catch; Future  -     TSH; Future  -     CBC Auto Differential; Future  -     Urinalysis With Microscopic - Urine, Clean Catch; Future        Assessment & Plan  1. Hypertension.  His blood pressure readings have been consistently elevated, with a notable increase since November 2024. He has also experienced a weight gain of 8 pounds, which may be contributing to his hypertension. His thyroid function and urinalysis were within normal limits during the last evaluation. He is advised to reduce carbohydrate intake and incorporate more salads and fruits into his diet. He will maintain his current dosage of lisinopril 20 mg. Laboratory tests will be conducted to assess liver and kidney function, cholesterol levels, and urine protein. He is instructed to monitor his blood pressure at home for 4 weeks in May 2025, recording any readings above 135/85. If more than half of the readings are elevated, he should contact the office to schedule an appointment.    2. Weight gain.  He has gained 8 pounds, which may be contributing to his elevated blood pressure. He is advised to reduce carbohydrate intake and incorporate more salads and fruits into his diet. Increasing physical activity is also recommended to help manage weight.    Follow-up  The patient will follow up in November 2025 for his annual physical examination.     BP checked 3 times on both arms 130/84, no medication change today, Rtc in November for physical       Follow Up   No follow-ups on file.  Patient was given instructions and counseling regarding his condition or for health maintenance advice. Please see specific information pulled into the AVS if appropriate.     Patient or patient representative verbalized consent for the use of Ambient Listening during the visit with  Kade Chaidez MD for chart documentation. 2/24/2025  09:26 EST

## 2025-05-25 DIAGNOSIS — I10 PRIMARY HYPERTENSION: ICD-10-CM

## 2025-05-27 RX ORDER — LISINOPRIL 20 MG/1
20 TABLET ORAL DAILY
Qty: 30 TABLET | Refills: 1 | Status: SHIPPED | OUTPATIENT
Start: 2025-05-27 | End: 2025-05-28 | Stop reason: SDUPTHER

## 2025-05-28 ENCOUNTER — OFFICE VISIT (OUTPATIENT)
Dept: FAMILY MEDICINE CLINIC | Facility: CLINIC | Age: 45
End: 2025-05-28
Payer: COMMERCIAL

## 2025-05-28 VITALS
TEMPERATURE: 98.5 F | BODY MASS INDEX: 35.33 KG/M2 | DIASTOLIC BLOOD PRESSURE: 82 MMHG | HEIGHT: 72 IN | HEART RATE: 79 BPM | OXYGEN SATURATION: 96 % | WEIGHT: 260.8 LBS | SYSTOLIC BLOOD PRESSURE: 120 MMHG | RESPIRATION RATE: 16 BRPM

## 2025-05-28 DIAGNOSIS — I10 PRIMARY HYPERTENSION: Primary | ICD-10-CM

## 2025-05-28 DIAGNOSIS — F90.2 ATTENTION DEFICIT HYPERACTIVITY DISORDER (ADHD), COMBINED TYPE: ICD-10-CM

## 2025-05-28 PROCEDURE — 99214 OFFICE O/P EST MOD 30 MIN: CPT | Performed by: STUDENT IN AN ORGANIZED HEALTH CARE EDUCATION/TRAINING PROGRAM

## 2025-05-28 RX ORDER — DEXTROAMPHETAMINE SACCHARATE, AMPHETAMINE ASPARTATE, DEXTROAMPHETAMINE SULFATE, AND AMPHETAMINE SULFATE 2.5; 2.5; 2.5; 2.5 MG/1; MG/1; MG/1; MG/1
TABLET ORAL
COMMUNITY
Start: 2025-05-13

## 2025-05-28 RX ORDER — LISINOPRIL 20 MG/1
20 TABLET ORAL DAILY
Qty: 90 TABLET | Refills: 3 | Status: SHIPPED | OUTPATIENT
Start: 2025-05-28

## 2025-05-28 NOTE — PROGRESS NOTES
Chief Complaint  Hypertension    Subjective    History of Present Illness  The patient is a 45-year-old gentleman who presents for a blood pressure follow-up and ADHD management.    He has been diagnosed with ADHD and is currently in his third week of Adderall therapy. He has observed an increase in his ADHD symptoms since discontinuing alcohol consumption 14 months ago, which he had previously used to manage his symptoms. He experiences inattentiveness, particularly when under stress at work. Adderall has improved his ability to focus on tasks, reducing the need for simultaneous engagement in multiple activities. He takes Adderall 10 mg twice daily, typically between 8:00 and 8:30 AM and between 1:00 and 1:30 PM, but adjusts the dosage based on his schedule. He plans to discuss the possibility of switching to an extended-release formulation with his psychiatrist during their next appointment on 06/10/2025. He reports no sleep disturbances, although he has a history of apnea and uses a machine. He typically goes to bed around 10:00 or 10:30 PM and wakes up early. He has noticed some appetite suppression since starting Adderall, but maintains a regular diet of coffee, a protein shake, lunch, and dinner. He also reports feeling slightly sleepy after taking Adderall, but does not consider this a significant issue. He is working with a therapist and psychiatrist to manage his anxiety, which he attributes to work-related stress and the pressure of managing his mother's estate. He does not feel the need for medication to manage his anxiety, but is interested in exploring lifestyle modifications such as increased exercise and improved diet. He has been practicing calming breathing exercises and sympathetic nervous system relaxation techniques to help manage his stress.    He has been monitoring his blood pressure before and after starting Adderall, noting that his systolic readings are consistently lower in the mornings.  "He has also observed occasional increases in his heart rate, which he attributes to various factors such as physical activity, meals, and rest periods. He anticipates that his blood pressure will improve with increased exercise during the summer months. He is currently on lisinopril 20 mg and has a few days' supply remaining.    SOCIAL HISTORY  The patient quit alcohol 14 months ago.       Justice Min presents to [facility]  Hypertension      Objective   Vital Signs:  /82   Pulse 79   Temp 98.5 °F (36.9 °C) (Oral)   Resp 16   Ht 182.9 cm (72\")   Wt 118 kg (260 lb 12.8 oz)   SpO2 96%   BMI 35.37 kg/m²   Estimated body mass index is 35.37 kg/m² as calculated from the following:    Height as of this encounter: 182.9 cm (72\").    Weight as of this encounter: 118 kg (260 lb 12.8 oz).            Physical Exam  Cardiovascular:      Rate and Rhythm: Normal rate.      Pulses: Normal pulses.      Heart sounds: Normal heart sounds.   Pulmonary:      Effort: Pulmonary effort is normal.      Breath sounds: Normal breath sounds.   Abdominal:      General: Bowel sounds are normal.      Palpations: Abdomen is soft.   Skin:     General: Skin is warm.   Neurological:      Mental Status: He is alert and oriented to person, place, and time.        Result Review :  The following data was reviewed by: Kade Chaidez MD on 05/28/2025:  CMP          6/19/2024    08:32 11/1/2024    09:40   CMP   Glucose 89  91    BUN 13  14    Creatinine 0.96  0.95    EGFR 100.6  101.9    Sodium 139  141    Potassium 4.8  5.0    Chloride 105  107    Calcium 8.7  8.9    Total Protein  6.6    Albumin  4.1    Globulin  2.5    Total Bilirubin  0.7    Alkaline Phosphatase  108    AST (SGOT)  19    ALT (SGPT)  31    Albumin/Globulin Ratio  1.6    BUN/Creatinine Ratio 13.5  14.7      CBC          11/1/2024    09:40   CBC   WBC 5.22    RBC 4.90    Hemoglobin 14.5    Hematocrit 43.5    MCV 88.8    MCH 29.6    MCHC 33.3  "   RDW 12.2    Platelets 250                Assessment and Plan   Diagnoses and all orders for this visit:    1. Primary hypertension (Primary)  -     lisinopril (PRINIVIL,ZESTRIL) 20 MG tablet; Take 1 tablet by mouth Daily.  Dispense: 90 tablet; Refill: 3    2. Attention deficit hyperactivity disorder (ADHD), combined type        Assessment & Plan  1. Hypertension.  - Blood pressure readings have been consistently below 120, with occasional spikes to 149.  - Physical exam today shows blood pressure at 120/82.  - Advised to maintain a healthy diet and regular exercise regimen.  - Prescription for lisinopril 20 mg will be sent to pharmacy. Instructed to monitor blood pressure daily at the same time each day while in a relaxed state. If home readings exceed 140, schedule an appointment for further evaluation.    2. Attention deficit hyperactivity disorder.  - Currently on Adderall 10 mg twice a day.  - Reports positive changes in ability to focus and complete tasks, with some appetite suppression and slight drowsiness.  - Discussed the possibility of switching to an extended-release formulation of Adderall with psychiatrist during follow-up appointment on 06/10/2025.  - Encouraged to continue working on anxiety management through non-pharmacological means such as exercise and therapy.            Follow Up   No follow-ups on file.  Patient was given instructions and counseling regarding his condition or for health maintenance advice. Please see specific information pulled into the AVS if appropriate.     Patient or patient representative verbalized consent for the use of Ambient Listening during the visit with  Kade Chaidez MD for chart documentation. 5/28/2025  13:09 EDT